# Patient Record
Sex: FEMALE | Race: WHITE | NOT HISPANIC OR LATINO | Employment: STUDENT | URBAN - METROPOLITAN AREA
[De-identification: names, ages, dates, MRNs, and addresses within clinical notes are randomized per-mention and may not be internally consistent; named-entity substitution may affect disease eponyms.]

---

## 2017-04-08 ENCOUNTER — APPOINTMENT (OUTPATIENT)
Dept: GENERAL RADIOLOGY | Facility: HOSPITAL | Age: 22
End: 2017-04-08

## 2017-04-08 ENCOUNTER — HOSPITAL ENCOUNTER (EMERGENCY)
Facility: HOSPITAL | Age: 22
Discharge: HOME | End: 2017-04-08
Attending: EMERGENCY MEDICINE | Admitting: EMERGENCY MEDICINE

## 2017-04-08 VITALS
WEIGHT: 170 LBS | HEART RATE: 108 BPM | OXYGEN SATURATION: 99 % | TEMPERATURE: 98.5 F | SYSTOLIC BLOOD PRESSURE: 140 MMHG | HEIGHT: 69 IN | DIASTOLIC BLOOD PRESSURE: 88 MMHG | RESPIRATION RATE: 16 BRPM | BODY MASS INDEX: 25.18 KG/M2

## 2017-04-08 DIAGNOSIS — S43.101A AC SEPARATION, TYPE 2, RIGHT, INITIAL ENCOUNTER: Primary | ICD-10-CM

## 2017-04-08 DIAGNOSIS — M25.511 ACUTE PAIN OF RIGHT SHOULDER: ICD-10-CM

## 2017-04-08 LAB
B-HCG UR QL: NEGATIVE
INTERNAL NEGATIVE CONTROL: NEGATIVE
INTERNAL POSITIVE CONTROL: POSITIVE
Lab: NORMAL

## 2017-04-08 PROCEDURE — 99282 EMERGENCY DEPT VISIT SF MDM: CPT

## 2017-04-08 PROCEDURE — 73030 X-RAY EXAM OF SHOULDER: CPT

## 2017-04-08 RX ORDER — IBUPROFEN 800 MG/1
800 TABLET ORAL EVERY 8 HOURS PRN
Qty: 30 TABLET | Refills: 0 | Status: SHIPPED | OUTPATIENT
Start: 2017-04-08

## 2017-04-08 NOTE — ED PROVIDER NOTES
Subjective   Patient is a 22 y.o. female presenting with shoulder problem.   History provided by:  Patient  Shoulder Problem   Location:  Shoulder  Shoulder location:  R shoulder  Injury: yes    Time since incident: Last night   Mechanism of injury comment:  Fell onto shoulder after falling off shoulders of a friend   Pain details:     Quality:  Shooting  Prior injury to area:  No  Associated symptoms: decreased range of motion    Associated symptoms: no muscle weakness, no neck pain, no numbness, no swelling and no tingling    Pt seen at [facility] and sent to ED for eval.     Review of Systems   Musculoskeletal: Positive for arthralgias (Right shoulder  ). Negative for neck pain.   All other systems reviewed and are negative.      No past medical history on file.    No Known Allergies    Past Surgical History:   Procedure Laterality Date   • KNEE ACL RECONSTRUCTION         No family history on file.    Social History     Social History   • Marital status: Single     Spouse name: N/A   • Number of children: N/A   • Years of education: N/A     Social History Main Topics   • Smoking status: Never Smoker   • Smokeless tobacco: Never Used   • Alcohol use Yes      Comment: occasionally   • Drug use: No   • Sexual activity: Defer     Other Topics Concern   • Not on file     Social History Narrative    single           Objective   Physical Exam   Constitutional: She appears well-developed and well-nourished. No distress.   HENT:   Head: Atraumatic.   Eyes: Conjunctivae are normal.   Neck: Normal range of motion. Neck supple.   Cardiovascular: Normal rate and intact distal pulses.    Pulmonary/Chest: Effort normal.   Musculoskeletal:        Right shoulder: She exhibits decreased range of motion, tenderness (anterior shoulder and distal clavicle ), swelling and deformity (distal clavicle elevation ). She exhibits normal pulse and normal strength.        Right elbow: She exhibits normal range of motion. No tenderness found.  "  Neurological: She is alert.   Skin: Skin is warm.   Psychiatric: She has a normal mood and affect.   Nursing note and vitals reviewed.      Procedures         ED Course  ED Course   Pt already given sling at [facility]. Discussed results with patient and need for sling and f/u with ortho.      Recent Results (from the past 24 hour(s))   POCT pregnancy, urine    Collection Time: 04/08/17  3:52 PM   Result Value Ref Range    HCG, Urine, QL Negative Negative    Lot Number [id number]     Internal Positive Control Positive     Internal Negative Control Negative      Note: In addition to lab results from this visit, the labs listed above may include labs taken at another facility or during a different encounter within the last 24 hours. Please correlate lab times with ED admission and discharge times for further clarification of the services performed during this visit.    XR Shoulder 2+ View Right   ED Interpretation   AC separation, 2nd degree per ER MD        Vitals:    04/08/17 1513   BP: 140/88   Pulse: 108   Resp: 16   Temp: 98.5 °F (36.9 °C)   TempSrc: Oral   SpO2: 99%   Weight: 170 lb (77.1 kg)   Height: 69\" (175.3 cm)     Medications - No data to display                        MDM    Final diagnoses:   AC separation, type 2, right, initial encounter   Acute pain of right shoulder            BREA Paulino  04/08/17 4302    "